# Patient Record
Sex: MALE | Race: WHITE | URBAN - METROPOLITAN AREA
[De-identification: names, ages, dates, MRNs, and addresses within clinical notes are randomized per-mention and may not be internally consistent; named-entity substitution may affect disease eponyms.]

---

## 2019-07-30 ENCOUNTER — APPOINTMENT (OUTPATIENT)
Dept: URBAN - METROPOLITAN AREA SURGERY 9 | Age: 44
Setting detail: DERMATOLOGY
End: 2019-07-30

## 2019-07-30 DIAGNOSIS — L92.3 FOREIGN BODY GRANULOMA OF THE SKIN AND SUBCUTANEOUS TISSUE: ICD-10-CM

## 2019-07-30 DIAGNOSIS — L82.1 OTHER SEBORRHEIC KERATOSIS: ICD-10-CM

## 2019-07-30 PROBLEM — L30.9 DERMATITIS, UNSPECIFIED: Status: ACTIVE | Noted: 2019-07-30

## 2019-07-30 PROCEDURE — OTHER REASSURANCE: OTHER

## 2019-07-30 PROCEDURE — OTHER PATHOLOGY BILLING: OTHER

## 2019-07-30 PROCEDURE — 99213 OFFICE O/P EST LOW 20 MIN: CPT | Mod: 25

## 2019-07-30 PROCEDURE — 11102 TANGNTL BX SKIN SINGLE LES: CPT

## 2019-07-30 PROCEDURE — 88305 TISSUE EXAM BY PATHOLOGIST: CPT | Mod: TC

## 2019-07-30 PROCEDURE — OTHER BIOPSY BY SHAVE METHOD: OTHER

## 2019-07-30 ASSESSMENT — LOCATION DETAILED DESCRIPTION DERM
LOCATION DETAILED: COLUMELLA
LOCATION DETAILED: LEFT INFERIOR CENTRAL MALAR CHEEK

## 2019-07-30 ASSESSMENT — LOCATION SIMPLE DESCRIPTION DERM
LOCATION SIMPLE: LEFT CHEEK
LOCATION SIMPLE: NOSE

## 2019-07-30 ASSESSMENT — LOCATION ZONE DERM
LOCATION ZONE: FACE
LOCATION ZONE: NOSE

## 2019-07-30 NOTE — PROCEDURE: PATHOLOGY BILLING
Immunohistochemistry (74355 and [id number]) billing is not performed here. Please use the Immunohistochemistry Stain Billing plan to accomplish this. Immunohistochemistry (56658 and [id number]) billing is not performed here. Please use the Immunohistochemistry Stain Billing plan to accomplish this.

## 2023-03-09 NOTE — PROCEDURE: PATHOLOGY BILLING
Number Of Group I Special Stains Used (78186): None Topical Steroids Applications Pregnancy And Lactation Text: Most topical steroids are considered safe to use during pregnancy and lactation.  Any topical steroid applied to the breast or nipple should be washed off before breastfeeding.

## 2025-04-25 NOTE — PROCEDURE: PATHOLOGY BILLING
Occupational Therapy   Treatment    Name: Rajendra Castle  MRN: [id number]  Admitting Diagnosis:  Hypertensive emergency       Recommendations:     Discharge Recommendations: Moderate Intensity Therapy  Discharge Equipment Recommendations:  walker, rolling  Barriers to discharge:  Decreased caregiver support    Assessment:     Rajendra Castle is a 77 y.o. male with a medical diagnosis of Hypertensive emergency.  He presents with dizzy after 2 mins standing and able to tolerate 5 mins before resting at EOB. Performance deficits affecting function are weakness, impaired endurance, impaired functional mobility, gait instability, impaired balance, visual deficits, decreased safety awareness.     Rehab Prognosis:  Good; patient would benefit from acute skilled OT services to address these deficits and reach maximum level of function.       Plan:     Patient to be seen 5 x/week to address the above listed problems via self-care/home management, therapeutic activities, therapeutic exercises  Plan of Care Expires: 05/15/25  Plan of Care Reviewed with: patient    Subjective     Chief Complaint: I'm dizzy  Patient/Family Comments/goals: get better  Pain/Comfort:  Pain Rating 1: 0/10    Objective:     Communicated with: nurseLorna prior to session.  Patient found HOB elevated with bed alarm, telemetry, peripheral IV upon OT entry to room.    General Precautions: Standard, fall, vision impaired    Orthopedic Precautions:N/A  Braces: N/A  Respiratory Status: Room air     Occupational Performance:     Bed Mobility:    Patient completed Scooting/Bridging with supervision  Patient completed Supine to Sit with supervision  Patient completed Sit to Supine with supervision     Functional Mobility/Transfers:  Patient completed Sit <> Stand Transfer with contact guard assistance  with  rolling walker and VC for propulsion from sit surface with BUE, however pt does not adhere and RW cross bar held down by FERNANDEZ.  Functional Mobility: 2 x  7ft/RW and CGA to bathroom for standing ADL at sink x 5 mins- report of dizzy at 2 min michael.    Activities of Daily Living:  Grooming: contact guard assistance while brushing teeth standing at sink with RW.      [facility] 6 Click ADL: 21    Treatment & Education:  -REBECCA completed RW navigation demo and instructions for parking and turning as DME is new to pt. He v/u and agreeable.  -While at EOB pt completed 2 mins balloon volley with BUE reaching outside HAILE, laterally, over head, and forward as needed to return volley with good vigor and expressed max fatigue at 2 min michael, requesting to lay down. Pt with good self-righting for EOB activity.    Patient left HOB elevated with all lines intact, call button in reach, bed alarm on, and nurseLorna notified    GOALS:   Multidisciplinary Problems       Occupational Therapy Goals          Problem: Occupational Therapy    Goal Priority Disciplines Outcome Interventions   Occupational Therapy Goal     OT, PT/OT Progressing    Description: Goals to be met by: 5/15/25     Patient will increase functional independence with ADLs by performing:    UE Dressing with Modified [place].  LE Dressing with Modified [place].  Grooming while standing at sink with Modified [place].  Toileting from toilet with Modified [place] for hygiene and clothing management.   Toilet transfer to toilet with Modified [place].                       Time Tracking:     OT Date of Treatment: 04/24/25  OT Start Time: 1340  OT Stop Time: 1403  OT Total Time (min): 23 min    Billable Minutes:Self Care/Home Management 15 min  Therapeutic Exercise 8 min    OT/MARIA TERESA: MARIA TERESA     Number of MARIA TERESA visits since last OT visit: 1    4/24/2025   Professional Component, Technical Component Or Both?: technical component